# Patient Record
Sex: FEMALE
[De-identification: names, ages, dates, MRNs, and addresses within clinical notes are randomized per-mention and may not be internally consistent; named-entity substitution may affect disease eponyms.]

---

## 2018-12-18 ENCOUNTER — APPOINTMENT (OUTPATIENT)
Dept: INTERNAL MEDICINE | Facility: CLINIC | Age: 43
End: 2018-12-18

## 2018-12-18 VITALS
SYSTOLIC BLOOD PRESSURE: 130 MMHG | BODY MASS INDEX: 26.22 KG/M2 | DIASTOLIC BLOOD PRESSURE: 80 MMHG | WEIGHT: 148 LBS | HEIGHT: 63 IN | HEART RATE: 76 BPM

## 2018-12-18 VITALS — TEMPERATURE: 99.4 F

## 2018-12-18 DIAGNOSIS — Z82.49 FAMILY HISTORY OF ISCHEMIC HEART DISEASE AND OTHER DISEASES OF THE CIRCULATORY SYSTEM: ICD-10-CM

## 2018-12-18 DIAGNOSIS — Z83.3 FAMILY HISTORY OF DIABETES MELLITUS: ICD-10-CM

## 2018-12-18 DIAGNOSIS — Z00.00 ENCOUNTER FOR GENERAL ADULT MEDICAL EXAMINATION W/OUT ABNORMAL FINDINGS: ICD-10-CM

## 2018-12-18 DIAGNOSIS — H52.4 PRESBYOPIA: ICD-10-CM

## 2018-12-18 DIAGNOSIS — R73.03 PREDIABETES.: ICD-10-CM

## 2018-12-18 DIAGNOSIS — K21.9 GASTRO-ESOPHAGEAL REFLUX DISEASE W/OUT ESOPHAGITIS: ICD-10-CM

## 2018-12-18 DIAGNOSIS — Z86.718 PERSONAL HISTORY OF OTHER VENOUS THROMBOSIS AND EMBOLISM: ICD-10-CM

## 2018-12-18 DIAGNOSIS — Z78.9 OTHER SPECIFIED HEALTH STATUS: ICD-10-CM

## 2018-12-18 DIAGNOSIS — Z95.2 PRESENCE OF PROSTHETIC HEART VALVE: ICD-10-CM

## 2018-12-18 DIAGNOSIS — Z86.79 PERSONAL HISTORY OF OTHER DISEASES OF THE CIRCULATORY SYSTEM: ICD-10-CM

## 2018-12-18 DIAGNOSIS — J18.9 PNEUMONIA, UNSPECIFIED ORGANISM: ICD-10-CM

## 2018-12-18 LAB
INR PPP: 1.3 RATIO
POCT-PROTHROMBIN TIME: 16.1 SECS
QUALITY CONTROL: YES

## 2018-12-18 RX ORDER — ACETAMINOPHEN 325 MG/1
TABLET, FILM COATED ORAL
Refills: 0 | Status: ACTIVE | COMMUNITY

## 2018-12-18 RX ORDER — WARFARIN SODIUM 6 MG/1
TABLET ORAL
Refills: 0 | Status: ACTIVE | COMMUNITY

## 2018-12-18 RX ORDER — LEVOFLOXACIN 750 MG/1
750 TABLET, FILM COATED ORAL DAILY
Qty: 5 | Refills: 0 | Status: ACTIVE | COMMUNITY
Start: 2018-12-18 | End: 1900-01-01

## 2018-12-18 RX ORDER — OMEPRAZOLE 20 MG/1
20 CAPSULE, DELAYED RELEASE ORAL
Refills: 0 | Status: ACTIVE | COMMUNITY

## 2018-12-18 RX ORDER — OFLOXACIN 3 MG/ML
0.3 SOLUTION/ DROPS OPHTHALMIC 4 TIMES DAILY
Qty: 1 | Refills: 0 | Status: COMPLETED | COMMUNITY
Start: 2018-12-18 | End: 2018-12-25

## 2018-12-20 LAB
ALBUMIN SERPL ELPH-MCNC: 4.2 G/DL
ALP BLD-CCNC: 119 U/L
ALT SERPL-CCNC: 41 U/L
ANION GAP SERPL CALC-SCNC: 13 MMOL/L
AST SERPL-CCNC: 31 U/L
BASOPHILS # BLD AUTO: 0.04 K/UL
BASOPHILS NFR BLD AUTO: 0.4 %
BILIRUB SERPL-MCNC: 0.2 MG/DL
BUN SERPL-MCNC: 11 MG/DL
CALCIUM SERPL-MCNC: 9.5 MG/DL
CHLORIDE SERPL-SCNC: 103 MMOL/L
CHOLEST SERPL-MCNC: 189 MG/DL
CHOLEST/HDLC SERPL: 4.6 RATIO
CO2 SERPL-SCNC: 24 MMOL/L
CREAT SERPL-MCNC: 0.61 MG/DL
EOSINOPHIL # BLD AUTO: 0.16 K/UL
EOSINOPHIL NFR BLD AUTO: 1.8 %
GLUCOSE SERPL-MCNC: 91 MG/DL
HBA1C MFR BLD HPLC: 5.9 %
HCT VFR BLD CALC: 39.1 %
HDLC SERPL-MCNC: 41 MG/DL
HGB BLD-MCNC: 12.5 G/DL
IMM GRANULOCYTES NFR BLD AUTO: 0.5 %
LDLC SERPL CALC-MCNC: 103 MG/DL
LYMPHOCYTES # BLD AUTO: 2.84 K/UL
LYMPHOCYTES NFR BLD AUTO: 31.2 %
MAN DIFF?: NORMAL
MCHC RBC-ENTMCNC: 27.2 PG
MCHC RBC-ENTMCNC: 32 GM/DL
MCV RBC AUTO: 85.2 FL
MONOCYTES # BLD AUTO: 0.83 K/UL
MONOCYTES NFR BLD AUTO: 9.1 %
NEUTROPHILS # BLD AUTO: 5.19 K/UL
NEUTROPHILS NFR BLD AUTO: 57 %
PLATELET # BLD AUTO: 313 K/UL
POTASSIUM SERPL-SCNC: 4.6 MMOL/L
PROT SERPL-MCNC: 7.9 G/DL
RBC # BLD: 4.59 M/UL
RBC # FLD: 14.8 %
SODIUM SERPL-SCNC: 140 MMOL/L
TRIGL SERPL-MCNC: 227 MG/DL
WBC # FLD AUTO: 9.11 K/UL

## 2018-12-24 ENCOUNTER — APPOINTMENT (OUTPATIENT)
Dept: INTERNAL MEDICINE | Facility: CLINIC | Age: 43
End: 2018-12-24

## 2018-12-31 ENCOUNTER — RESULT CHARGE (OUTPATIENT)
Age: 43
End: 2018-12-31

## 2018-12-31 ENCOUNTER — APPOINTMENT (OUTPATIENT)
Dept: INTERNAL MEDICINE | Facility: CLINIC | Age: 43
End: 2018-12-31

## 2019-01-22 ENCOUNTER — APPOINTMENT (OUTPATIENT)
Dept: INTERNAL MEDICINE | Facility: CLINIC | Age: 44
End: 2019-01-22

## 2019-01-22 NOTE — HISTORY OF PRESENT ILLNESS
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

## 2019-01-29 LAB
INR PPP: 1.2 RATIO
INR PPP: 3 RATIO
POCT-PROTHROMBIN TIME: 14.3 SECS
POCT-PROTHROMBIN TIME: 35.9 SECS
QUALITY CONTROL: YES
QUALITY CONTROL: YES

## 2019-01-29 NOTE — HISTORY OF PRESENT ILLNESS
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  Pt recently moved from [facility] and is establishing care here.\par \par Pt states she began to feel poorly last week.  Pt states she was having fevers and chills.  She started taking tylenol for aches.  On 12/14, pt went to clinic for checking warfarin.  At that point, she states she did tell them she was not feeling well.  Pt was not given anything for this at this time.  At this appointment, INR was found to be 7.7 and pt was instructed to stop coumadin and resume on Monday.  Pt states she came to [facility] 2 days ago.  On Monday, she found she was having epitaxis so decided to not start her coumadin on this day.  Pt was still feeling flu-like sx.  Pt made an appointment with our clinic today to establish care.\par \par At time of exam, pt was no longer having epitaxis.  She was still having fevers and chills at home.  Pt stated morning of appointment, she started having crusting of her eyes where she could not open her eyes.  In addition, she c/o conjunctival injection and discharge.  Pt c/po nasal congestion, myalgias, throat pain and earaches at time of exam.  States she moved in with her cousin who had been sick recently.\par \par

## 2019-01-29 NOTE — ASSESSMENT
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  Given prothetic valve, will treat for bacterial pna.\par \par # Bacterial pna\par Pt most likley to have CAP\par - will start with 5 day course of levofloxacin.\par - Encouraged hydration and rest\par \par # Conjunctivitis\par Pt with red eyes and crusting, indicative of conjunctivitis.  \par - Oxflacin drops given today for conunctivitis\par \par # Supratherapeutic INR\par Pt with INR 1.3 today.  Given she will be on l\par evofloxacin, expect levels to increase.\par - decrease coumadin to 2 mg QD while on Levofloxacin\par - will check INR on Monday, Dec 24\par - Depending on INR, will adjust dose\par \par # HCM\par - Establishing care in clinic\par - Per paperwork, last mammo 7/2018 -- repeat in 1 yr\par - Last pap was 7/2018 -- will need repeat\par - Last optho was 11/18 -- repeat in 1 yr.\par - CBC, CMP, lipids and HbA1c checked today\par - Cannot give flu shot with active illness.  \par - RTC in 5 weeks for full CPE\par \par Discussed with Dr. Bentley

## 2019-01-29 NOTE — END OF VISIT
[] : Resident [FreeTextEntry3] : 43 y.o F with hx of rheumatic heart disease s/p bioprosthetic heart valve and prediabetes presents to establish care.\par URI symptoms-Pt with subjective fvers,cough, sore throat concerning for viral illness vs pneumonia. Symptoms worsening over 1 week no improvement with supportive care. Will start levofloxacin 500 qD X 5 fays \par Conjunctivitis-PT with b/l red eyes and crusting of the eyes for a few days suspicious for bacterial conjunctivitis. Will treat with oflaxacin  QID X 5 days \par Mitral Valve-INR today ids 1.3 not on any coumadin, given that pt will be taking antibiotics will decrease coumadin to 2 mg daily until the 24th of December. \par HCM-CBC,CMP,A1C Lipid profile.

## 2019-01-29 NOTE — REVIEW OF SYSTEMS
[Fever] : fever [Chills] : chills [Fatigue] : fatigue [Discharge] : discharge [Pain] : pain [Redness] : redness [Dryness] : dryness  [Itching] : itching [Earache] : earache [Nosebleed] : nosebleed [Hoarseness] : hoarseness [Sore Throat] : sore throat [Cough] : cough [Dyspnea on Exertion] : dyspnea on exertion [Joint Pain] : joint pain [Muscle Weakness] : muscle weakness [Muscle Pain] : muscle pain [Back Pain] : back pain [Chest Pain] : no chest pain [Palpitations] : no palpitations [Lower Ext Edema] : no lower extremity edema [Orthopnea] : no orthopnea [Paroysmal Nocturnal Dyspnea] : no paroysmal nocturnal dyspnea [Wheezing] : no wheezing [Abdominal Pain] : no abdominal pain [Nausea] : no nausea [Constipation] : no constipation [Diarrhea] : diarrhea [Vomiting] : no vomiting [Heartburn] : no heartburn [Melena] : no melena [Dysuria] : no dysuria [Incontinence] : no incontinence [Nocturia] : no nocturia [Hematuria] : no hematuria [Vaginal Discharge] : no vaginal discharge

## 2019-01-29 NOTE — PHYSICAL EXAM
[Well Nourished] : well nourished [Well Developed] : well developed [Normal Voice/Communication] : normal voice/communication [Normal TMs] : both tympanic membranes were normal [Supple] : supple [No Lymphadenopathy] : no lymphadenopathy [No Respiratory Distress] : no respiratory distress  [No Accessory Muscle Use] : no accessory muscle use [Normal Rate] : normal rate  [Regular Rhythm] : with a regular rhythm [Normal S1, S2] : normal S1 and S2 [No Edema] : there was no peripheral edema [No Extremity Clubbing/Cyanosis] : no extremity clubbing/cyanosis [Soft] : abdomen soft [Non Tender] : non-tender [Non-distended] : non-distended [No HSM] : no HSM [Normal Bowel Sounds] : normal bowel sounds [Normal Supraclavicular Nodes] : no supraclavicular lymphadenopathy [Normal Axillary Nodes] : no axillary lymphadenopathy [Normal Posterior Cervical Nodes] : no posterior cervical lymphadenopathy [Normal Anterior Cervical Nodes] : no anterior cervical lymphadenopathy [No CVA Tenderness] : no CVA  tenderness [No Spinal Tenderness] : no spinal tenderness [No Joint Swelling] : no joint swelling [No Rash] : no rash [Normal Gait] : normal gait [Memory Grossly Normal] : memory grossly normal [Normal Mood] : the mood was normal [Normal Insight/Judgement] : insight and judgment were intact [de-identified] : Sniffling [de-identified] : Injected conjunctiva with tears noted; PEERL [de-identified] : Throat erythematous with enlarged tonsils; grayish film on throat; no vesicles noted. [de-identified] : Mild crackles heard bilaterally at lung bases.

## 2019-01-29 NOTE — ADDENDUM
[FreeTextEntry1] : Pt has mechanical valve not bioprosthetic. PT has not been in clinic for INR checks or follow up. Will call patient.